# Patient Record
Sex: MALE | Race: WHITE | Employment: FULL TIME | URBAN - METROPOLITAN AREA
[De-identification: names, ages, dates, MRNs, and addresses within clinical notes are randomized per-mention and may not be internally consistent; named-entity substitution may affect disease eponyms.]

---

## 2017-11-07 ENCOUNTER — OFFICE VISIT (OUTPATIENT)
Dept: URGENT CARE | Facility: URGENT CARE | Age: 27
End: 2017-11-07
Payer: COMMERCIAL

## 2017-11-07 VITALS
HEART RATE: 80 BPM | DIASTOLIC BLOOD PRESSURE: 84 MMHG | TEMPERATURE: 98 F | OXYGEN SATURATION: 98 % | SYSTOLIC BLOOD PRESSURE: 147 MMHG | BODY MASS INDEX: 21.48 KG/M2 | WEIGHT: 158.4 LBS

## 2017-11-07 DIAGNOSIS — R21 RASH AND NONSPECIFIC SKIN ERUPTION: Primary | ICD-10-CM

## 2017-11-07 DIAGNOSIS — R03.0 ELEVATED BLOOD PRESSURE READING WITHOUT DIAGNOSIS OF HYPERTENSION: ICD-10-CM

## 2017-11-07 PROCEDURE — 99203 OFFICE O/P NEW LOW 30 MIN: CPT | Performed by: FAMILY MEDICINE

## 2017-11-07 NOTE — MR AVS SNAPSHOT
"              After Visit Summary   11/7/2017    Jose Parker    MRN: [id number]           Patient Information     Date Of Birth          1990        Visit Information        Provider Department      11/7/2017 3:25 PM Olga Lidia Parrish MD [facility]        Today's Diagnoses     Rash and nonspecific skin eruption    -  1      Care Instructions    -Try over-the-counter 1% Hydrocortisone cream twice daily x 3 days.  -If no improvement, try Lotrimin AF 1% cream twice daily x 7 days.  -If worsening condition or failure to improve with Hydrocortisone and Lotrimin AF, please follow up for further evaluation.          Follow-ups after your visit        Who to contact     If you have questions or need follow up information about today's clinic visit or your schedule please contact [facility] directly at [phone].  Normal or non-critical lab and imaging results will be communicated to you by [employer], letter or phone within 4 business days after the clinic has received the results. If you do not hear from us within 7 days, please contact the clinic through ideaForge[employer] or phone. If you have a critical or abnormal lab result, we will notify you by phone as soon as possible.  Submit refill requests through [employer] or call your pharmacy and they will forward the refill request to us. Please allow 3 business days for your refill to be completed.          Additional Information About Your Visit        MyChart Information     [employer] lets you send messages to your doctor, view your test results, renew your prescriptions, schedule appointments and more. To sign up, go to [email or web address] . Click on \"Log in\" on the left side of the screen, which will take you to the Welcome page. Then click on \"Sign up Now\" on the right side of the page.     You will be asked to enter the access code listed below, as well as some personal information. Please follow the directions to " create your username and password.     Your access code is: [email or web address]  Expires: 2018  4:19 PM     Your access code will  in 90 days. If you need help or a new code, please call your Only clinic or [phone].        Care EveryWhere ID     This is your Care EveryWhere ID. This could be used by other organizations to access your Only medical records  [id number]        Your Vitals Were     Pulse Temperature Pulse Oximetry BMI (Body Mass Index)          80 98  F (36.7  C) (Tympanic) 98% 21.48 kg/m2         Blood Pressure from Last 3 Encounters:   17 147/84   16 124/78   14 122/74    Weight from Last 3 Encounters:   17 158 lb 6.4 oz (71.8 kg)   16 160 lb (72.6 kg)   10/17/11 155 lb (70.3 kg)              Today, you had the following     No orders found for display       Primary Care Provider    Physician No Ref-Primary       NO REF-PRIMARY PHYSICIAN        Equal Access to Services     [facility]: Hadii aad ku hadasho Soomaali, waaxda luqadaha, qaybta kaalmada adeegyaney, shaila potter . So [facility] [phone].    ATENCIÓN: Si habla español, tiene a hills disposición servicios gratuitos de asistencia lingüística. Llame al [phone].    We comply with applicable federal civil rights laws and [place] laws. We do not discriminate on the basis of race, color, national origin, age, disability, sex, sexual orientation, or gender identity.            Thank you!     Thank you for choosing [facility]  for your care. Our goal is always to provide you with excellent care. Hearing back from our patients is one way we can continue to improve our services. Please take a few minutes to complete the written survey that you may receive in the mail after your visit with us. Thank you!             Your Updated Medication List - Protect others around you: Learn how to safely use, store and throw away your medicines at www.disposemymeds.org.           This list is accurate as of: 11/7/17  4:19 PM.  Always use your most recent med list.                   Brand Name Dispense Instructions for use Diagnosis    NO ACTIVE MEDICATIONS

## 2017-11-07 NOTE — NURSING NOTE
Chief Complaint   Patient presents with     Urgent Care     Derm Problem     Pt states x 3 days has had dry itchy patch on penis.        Initial /84 (BP Location: Right arm, Patient Position: Chair, Cuff Size: Adult Regular)  Pulse 80  Temp 98  F (36.7  C) (Tympanic)  Wt 158 lb 6.4 oz (71.8 kg)  SpO2 98%  BMI 21.48 kg/m2 Estimated body mass index is 21.48 kg/(m^2) as calculated from the following:    Height as of 10/17/11: 6' (1.829 m).    Weight as of this encounter: 158 lb 6.4 oz (71.8 kg).  Medication Reconciliation: unable or not appropriate to perform   Prabhu Watts CMA (AAMA) 11/7/2017 3:44 PM

## 2017-11-07 NOTE — PATIENT INSTRUCTIONS
-Try over-the-counter 1% Hydrocortisone cream twice daily x 3 days.  -If no improvement, try Lotrimin AF 1% cream twice daily x 7 days.  -If worsening condition or failure to improve with Hydrocortisone and Lotrimin AF, please follow up for further evaluation.

## 2017-11-08 NOTE — PROGRESS NOTES
CC:    Chief Complaint   Patient presents with     Urgent Care     Derm Problem     Pt states x 3 days has had dry itchy patch on penis.        SUBJECTIVE:  The patient is a 26 year old male, who presents with a rash involving his penis and scrotum, first noticed after spending the night in a questionable hotel 4 nights ago.  Patient used new soaps and detergents at the hotel.  He is concerned that he may have contracted an STD from the hotel sheets.  Initial rash was itchy and mildly painful, with a small amount of blood noted on 1 occasion.  No purulent drainage or blistering reported.  Patient recalls wearing new long underwear and jeans for an extended time prior to the rash onset.  No history of sexually activity, dysuria, urinary frequency, hematuria, abdominal pain, or back pain.  Patient denies trying over-the-counter treatments, as the rash and symptoms initially resolved over a 48 hour period.  Patient decided to seek evaluation today, as he noticed chaffing, mild itching, and a dry, scaly patch at the base of his penis while he was at work.  Patient would like to rule out STD or treatable infection today.    Past Medical History:   Diagnosis Date     Hematuria      Rumination disorder      Current Outpatient Prescriptions   Medication Sig Dispense Refill     NO ACTIVE MEDICATIONS        No Known Allergies  Social History   Substance Use Topics     Smoking status: Never Smoker     Smokeless tobacco: Not on file     Alcohol use No     FH:  Father with unspecified heart valve problem and eczema.    ROS:  The patient noted URI symptoms the past 7-10 days, including nasal congestion and postnasal drainage.  Occasional cough, but no fever, sore throat, facial pressure, chest pain, or shortness of breath.  Patient declines being seen for his URI symptoms today.    OBJECTIVE:  /84 (BP Location: Right arm, Patient Position: Chair, Cuff Size: Adult Regular)  Pulse 80  Temp 98  F (36.7  C) (Tympanic)  Wt  158 lb 6.4 oz (71.8 kg)  SpO2 98%  BMI 21.48 kg/m2  GENERAL APPEARANCE:  Awake, alert, and in no acute distress.  PSYCHIATRIC:  Pleasant affect.  HEENT:  Sclera anicteric.  No conjunctivitis.  PERRLA.  Extraocular movements are intact.  Bilateral TM's and canals are within normal limits.  Mild nasal congestion.  Some clear postnasal drainage.  No erythema, edema, or exudates of the oral mucosa or posterior pharynx.  Mucous membranes moist.  NECK:  Spontaneous full range of motion.  No thyromegaly or mass.  No lymphadenopathy.  HEART:  Normal S1, S2.  Regular rate and rhythm.  No murmurs, rubs, or gallops.  LUNGS:  No respiratory distress.  No wheezes, rales, or rhonchi.  ABDOMEN:  Not distended.  Soft.  Not tender.  No mass.  BACK:  No CVA tenderness.  EXTREMITIES:  Moves 4 extremities.   No edema.  NEUROLOGIC:  Gait within normal limits.  No facial droop or acute neurologic deficits.  /SKIN:  Chaperone was in room during /SKIN exam today.  Circumcised penis.  There is a quarter-sized patch of whitish scale at the based of the penis on the left, but no significant erythema noted.  There is a 3 mm area of brownish hyperpigmentation at the tip of the penis, but no erythema, blistering, or lesions noted.    LABORATORY:  Discussed risks and benefits of STD and Urinalysis testing, which patient agrees with holding off on post discussion today.  Patient does not have history or exam findings to suggest STD or UTI currently.      ASSESSMENT/PLAN:  1. Rash and nonspecific skin eruption, suspect contact/irritant dermatitis versus tinea cruris.  Doubt STD, based on history and exam.    -Try over-the-counter 1% Hydrocortisone cream twice daily x 3 days.  -If no improvement, try Lotrimin AF 1% cream twice daily x 7 days.  Patient declines prescription.  -If worsening condition or failure to improve with Hydrocortisone and Lotrimin AF, follow up for further evaluation, as discussed.    2. Elevated blood pressure reading  without diagnosis of hypertension.    -Follow blood pressures out in the community.  -Follow up if blood pressure is consistently > 140/90.  -Patient stated understanding.    The patient was discharged ambulatory and in stable condition post discussion of follow up.

## 2017-12-28 ENCOUNTER — HOSPITAL ENCOUNTER (EMERGENCY)
Facility: CLINIC | Age: 27
Discharge: HOME | End: 2017-12-28
Attending: EMERGENCY MEDICINE | Admitting: EMERGENCY MEDICINE
Payer: COMMERCIAL

## 2017-12-28 ENCOUNTER — APPOINTMENT (OUTPATIENT)
Dept: GENERAL RADIOLOGY | Facility: CLINIC | Age: 27
End: 2017-12-28
Attending: EMERGENCY MEDICINE
Payer: COMMERCIAL

## 2017-12-28 VITALS
BODY MASS INDEX: 21.7 KG/M2 | SYSTOLIC BLOOD PRESSURE: 114 MMHG | DIASTOLIC BLOOD PRESSURE: 73 MMHG | OXYGEN SATURATION: 98 % | HEART RATE: 78 BPM | TEMPERATURE: 97.7 F | WEIGHT: 160 LBS | RESPIRATION RATE: 16 BRPM

## 2017-12-28 DIAGNOSIS — G24.3 SPASMODIC TORTICOLLIS: ICD-10-CM

## 2017-12-28 PROCEDURE — 25000132 ZZH RX MED GY IP 250 OP 250 PS 637: Performed by: EMERGENCY MEDICINE

## 2017-12-28 PROCEDURE — 99283 EMERGENCY DEPT VISIT LOW MDM: CPT

## 2017-12-28 PROCEDURE — 72040 X-RAY EXAM NECK SPINE 2-3 VW: CPT

## 2017-12-28 RX ORDER — DIAZEPAM 5 MG
5 TABLET ORAL ONCE
Status: COMPLETED | OUTPATIENT
Start: 2017-12-28 | End: 2017-12-28

## 2017-12-28 RX ORDER — LIDOCAINE HYDROCHLORIDE 10 MG/ML
INJECTION, SOLUTION INFILTRATION; PERINEURAL
Status: DISCONTINUED
Start: 2017-12-28 | End: 2017-12-28 | Stop reason: WASHOUT

## 2017-12-28 RX ORDER — IBUPROFEN 800 MG/1
800 TABLET, FILM COATED ORAL ONCE
Status: COMPLETED | OUTPATIENT
Start: 2017-12-28 | End: 2017-12-28

## 2017-12-28 RX ORDER — LIDOCAINE 50 MG/G
1-2 PATCH TOPICAL EVERY 24 HOURS
Qty: 6 PATCH | Refills: 0 | Status: SHIPPED | OUTPATIENT
Start: 2017-12-28

## 2017-12-28 RX ORDER — DIAZEPAM 5 MG
5-10 TABLET ORAL EVERY 6 HOURS PRN
Qty: 10 TABLET | Refills: 0 | Status: SHIPPED | OUTPATIENT
Start: 2017-12-28 | End: 2018-01-04

## 2017-12-28 RX ORDER — LIDOCAINE 50 MG/G
1-2 PATCH TOPICAL EVERY 24 HOURS
Qty: 6 PATCH | Refills: 0 | Status: SHIPPED | OUTPATIENT
Start: 2017-12-28 | End: 2017-12-28

## 2017-12-28 RX ADMIN — IBUPROFEN 800 MG: 800 TABLET, FILM COATED ORAL at 06:53

## 2017-12-28 RX ADMIN — DIAZEPAM 5 MG: 5 TABLET ORAL at 06:53

## 2017-12-28 ASSESSMENT — ENCOUNTER SYMPTOMS
NECK PAIN: 1
TROUBLE SWALLOWING: 0
FEVER: 0
SHORTNESS OF BREATH: 0
NUMBNESS: 0
NECK STIFFNESS: 1
SORE THROAT: 0

## 2017-12-28 NOTE — DISCHARGE INSTRUCTIONS
Prescription strength dosing instructions:  - 600mg ibuprofen (Advil, Motrin) every 6 hours as needed for fever/pain/inflammation.  Naprosyn (Naproxen, Aleve) works similarly and can be used instead, if preferred.  - 650mg acetaminophen (tylenol) every 4 hours or 1000mg every 6 hours (maximum of 4000mg in 24 hours).  Acetaminophen is often in combination over the counter and prescription pain medications.  Be sure to include this in daily total.    These medications work differently and can be used in combination.      Torticollis (Wry Neck)  Torticollis happens when muscles on one side of the neck contract (tighten). This causes the neck to twist or tilt to the side. The muscles may also be quite sore. It affects mainly children and young adults, often appearing overnight. It can also affect infants who develop or are born with tight neck muscles on one side.  What causes torticollis?  Causes of torticollis include:    Congenital (present at birth). Injury to the neck muscles from an accident or other trauma, or even just sleeping in an unusual position    Side effect of certain medicines or drugs    Problems with the bones of the neck (which can happen after an infection or injury)    Spasm of the muscles due to an infection, such as an abscess in the neck  When to go to the emergency room (ER)  All neck problems should be checked by a healthcare provider within 24 hours. Seek emergency care if you can't reach your healthcare provider or these symptoms are present:    Trouble breathing or swallowing or in smaller children, continuous drooling    Numbness or weakness in the arms and legs    Trouble walking or speaking    Fever  What to expect in the ER  The neck will be examined, and questions about any current or former medical problems will be asked. X-rays of the neck may be taken to check for broken bones.  Treatment  The goal in treating torticollis is to relax the neck muscles. The best approach will depend  on the cause of the problem. In most cases, one or more of the following may be given:    Medicines to help relax the muscles and reduce swelling    Hot and cold compresses to help ease muscle tightness    Botulinum toxin injections to prevent further muscle spasms    Physical therapy to help stretch and relax the muscles    Treatment of any infection, which may need intravenous antibiotics or surgery  Follow-up  Depending on the cause, torticollis often goes away on its own. Follow up with your healthcare provider as instructed. If symptoms become worse, call your healthcare provider or return to the ER.  Date Last Reviewed: 9/30/2015 2000-2017 [employer]. [address] [place] [address], [place] [address]. All rights reserved. This information is not intended as a substitute for professional medical care. Always follow your healthcare professional's instructions.

## 2017-12-28 NOTE — ED AVS SNAPSHOT
[facility] Emergency Department    [address]    [facility] [address]    Phone:  [phone]    Fax:  [phone]                                       Jose Parker   MRN: [id number]    Department:  [facility] Emergency Department   Date of Visit:  12/28/2017           After Visit Summary Signature Page     I have received my discharge instructions, and my questions have been answered. I have discussed any challenges I see with this plan with the nurse or doctor.    ..........................................................................................................................................  Patient/Patient Representative Signature      ..........................................................................................................................................  Patient Representative Print Name and Relationship to Patient    ..................................................               ................................................  Date                                            Time    ..........................................................................................................................................  Reviewed by Signature/Title    ...................................................              ..............................................  Date                                                            Time

## 2017-12-28 NOTE — ED NOTES
Alert and oriented x 3 airway,breathing and circulation intact, neck pain which woke him at 1am, does not remember any injury except ran into a garage door yesterday

## 2017-12-28 NOTE — LETTER
[facility] EMERGENCY DEPARTMENT  [address] [place] [address]  [phone]    Joseana Parker  [address] [place] [address]  [phone] (home) NONE (work)    : 1990      To Whom it may concern:    Jose Parker was seen in our Emergency Department today, 2017.  Return to work is based on symptom improvement expected in 1-3 days.    Sincerely,      Twila Card MD

## 2017-12-28 NOTE — ED PROVIDER NOTES
History     Chief Complaint:  Neck Pain    HPI   Jose Parker is a 27 year old male who presents with neck pain. The patient states he was woken from sleep about 5.5 hours prior to arrival with a terrible neck pain. He states he was not able to get out of bed immediately, but when he held his head completely still he could resolve his pain and come here for evaluation. He has had no recent illness, but states he ran into a garage door yesterday, which jerked his neck. He felt fine subsequent to that event, and slept in a normal position last night. The patient denies numbness, tingling, sore throat, difficulty breathing or swallowing, and states no other concerns at this time.  Applied heat at home which helped; cool did not.    Allergies:  No known drug allergies.     Medications:    The patient is currently on no regular medications.     Past Medical History:    Hematuria  Rumination disorder     Past Surgical History:    Oral skin graft     Family History:    History reviewed. No pertinent family history.     Social History:  Presents to the emergency department with his father.    Marital Status:  Single   Smoking status: No   Alcohol use: No       Review of Systems   Constitutional: Negative for fever.   HENT: Negative for sore throat and trouble swallowing.    Respiratory: Negative for shortness of breath.    Musculoskeletal: Positive for neck pain and neck stiffness.   Neurological: Negative for numbness.   All other systems reviewed and are negative.    Physical Exam     Patient Vitals for the past 24 hrs:   BP Temp Heart Rate Resp SpO2 Weight   12/28/17 0621 (!) 120/104 97.7  F (36.5  C) 68 18 100 % 72.6 kg (160 lb)      Physical Exam  General: Cooperative, keeping head straight  Head:  The scalp, face, and head appear normal and symmetric  Eyes:  Sclera white  ENT:    External ears and nares normal  Neck:  No meningismus or bruit, no midline tenderness, L paraspinal tenderness at medial trapezius  ridge  CV:  Rate as above with regular rhythm   Resp:  Breath sounds clear and equal bilaterally  MS:  Moves all extremities  Neuro:  Speech is normal and fluent. No apparent deficit    Strength 5/5 x4.  Sensation intact x4.     Emergency Department Course   Imaging:  Radiology findings were communicated with the patient who voiced understanding of the findings.  Cervical spine XR, 2-3 views   Preliminary Result   IMPRESSION: No acute fracture or subluxation. No prevertebral soft   tissue swelling. Loss of the normal cervical lordosis may be   positional.         Interventions:  0653: Valium 5 mg PO  0653: Ibuprofen, 800 mg, PO      Emergency Department Course:  Nursing notes and vitals reviewed.  I performed an exam of the patient as documented above.   The patient was sent for a C-spine x-ray while in the emergency department, results above.   0708: Patient rechecked and updated.   Findings and plan explained to the patient. Patient discharged home with instructions regarding supportive care, medications, and reasons to return. The importance of close follow-up was reviewed.   I personally reviewed the imaging results with the Patient and answered all related questions prior to discharge.     Impression & Plan      Medical Decision Making:   Jose Parker is a 27 year old male who presents for evaluation of neck pain and difficulty turning.   Clinical examination is consistent with torticollis. There is no history of severe mechanism trauma and no evidence of cervical radiculopathy, ligamentous instability, myelopathy, dissection, spinal tumor or abscess at this time. No fevers or sore throat or clinical evidence of deep space infection such as retropharyngeal abscess, epiglottitis or meningitis. He has no risk factors for spinal epidural abscess or hematoma is without other neurologic symptoms.  Imaging of the cervical spine was unremarkable.  I do not feel laboratory studies are indicated at this point.  I  discussed worrisome symptoms/signs, if they were to evolve, that should prompt the patient to follow up more quickly or return to the ED.  Supportive outpatient management is indicated and antispasmodics and pain medication prescriptions were provided.  Lidocaine/trigger point injection offered and declined by the patient.    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    [facility] EMERGENCY DEPARTMENT       Twila Card MD  12/28/17 0078

## 2017-12-28 NOTE — ED AVS SNAPSHOT
[facility]    [address]    [facility] [address]    Phone:  [phone]    Fax:  [phone]                                       Jose Parker   MRN: [id number]    Department:  [facility]   Date of Visit:  12/28/2017           Patient Information     Date Of Birth          1990        Your diagnoses for this visit were:     Spasmodic torticollis        You were seen by Twila Card MD.      Follow-up Information     Follow up with [facility].    Specialty:  EMERGENCY MEDICINE    Why:  As needed, If symptoms worsen    Contact information:    [address] [place] [address] [phone]        Discharge Instructions         Prescription strength dosing instructions:  - 600mg ibuprofen (Advil, Motrin) every 6 hours as needed for fever/pain/inflammation.  Naprosyn (Naproxen, Aleve) works similarly and can be used instead, if preferred.  - 650mg acetaminophen (tylenol) every 4 hours or 1000mg every 6 hours (maximum of 4000mg in 24 hours).  Acetaminophen is often in combination over the counter and prescription pain medications.  Be sure to include this in daily total.    These medications work differently and can be used in combination.      Torticollis (Wry Neck)  Torticollis happens when muscles on one side of the neck contract (tighten). This causes the neck to twist or tilt to the side. The muscles may also be quite sore. It affects mainly children and young adults, often appearing overnight. It can also affect infants who develop or are born with tight neck muscles on one side.  What causes torticollis?  Causes of torticollis include:    Congenital (present at birth). Injury to the neck muscles from an accident or other trauma, or even just sleeping in an unusual position    Side effect of certain medicines or drugs    Problems with the bones of the neck  (which can happen after an infection or injury)    Spasm of the muscles due to an infection, such as an abscess in the neck  When to go to the emergency room (ER)  All neck problems should be checked by a healthcare provider within 24 hours. Seek emergency care if you can't reach your healthcare provider or these symptoms are present:    Trouble breathing or swallowing or in smaller children, continuous drooling    Numbness or weakness in the arms and legs    Trouble walking or speaking    Fever  What to expect in the ER  The neck will be examined, and questions about any current or former medical problems will be asked. X-rays of the neck may be taken to check for broken bones.  Treatment  The goal in treating torticollis is to relax the neck muscles. The best approach will depend on the cause of the problem. In most cases, one or more of the following may be given:    Medicines to help relax the muscles and reduce swelling    Hot and cold compresses to help ease muscle tightness    Botulinum toxin injections to prevent further muscle spasms    Physical therapy to help stretch and relax the muscles    Treatment of any infection, which may need intravenous antibiotics or surgery  Follow-up  Depending on the cause, torticollis often goes away on its own. Follow up with your healthcare provider as instructed. If symptoms become worse, call your healthcare provider or return to the ER.  Date Last Reviewed: 9/30/2015 2000-2017 [employer]. [address] [place] [address]. All rights reserved. This information is not intended as a substitute for professional medical care. Always follow your healthcare professional's instructions.          24 Hour Appointment Hotline       To make an appointment at any [facility], call [phone] ([phone]). If you don't have a family doctor or clinic, we will help you find one. [place] clinics are conveniently located to serve the needs of you  and your family.             Review of your medicines      START taking        Dose / Directions Last dose taken    diazepam 5 MG tablet   Commonly known as:  VALIUM   Dose:  5-10 mg   Quantity:  10 tablet        Take 1-2 tablets (5-10 mg) by mouth every 6 hours as needed for muscle spasms   Refills:  0        lidocaine 5 % Patch   Commonly known as:  LIDODERM   Dose:  1-2 patch   Quantity:  6 patch        Place 1-2 patches onto the skin every 24 hours   Refills:  0          Our records show that you are taking the medicines listed below. If these are incorrect, please call your family doctor or clinic.        Dose / Directions Last dose taken    NO ACTIVE MEDICATIONS        Refills:  0                Prescriptions were sent or printed at these locations (2 Prescriptions)                   Other Prescriptions                Printed at Department/Unit printer (2 of 2)         diazepam (VALIUM) 5 MG tablet               lidocaine (LIDODERM) 5 % Patch                Procedures and tests performed during your visit     Cervical spine XR, 2-3 views      Orders Needing Specimen Collection     None      Pending Results     Date and Time Order Name Status Description    12/28/2017 0631 Cervical spine XR, 2-3 views Preliminary             Pending Culture Results     No orders found from 12/26/2017 to 12/29/2017.            Pending Results Instructions     If you had any lab results that were not finalized at the time of your Discharge, you can call the ED Lab Result RN at [phone]. You will be contacted by this team for any positive Lab results or changes in treatment. The nurses are available 7 days a week from 10A to 6:30P.  You can leave a message 24 hours per day and they will return your call.        Test Results From Your Hospital Stay        12/28/2017  7:02 AM      Narrative     XR CERVICAL SPINE 2/3 VWS  12/28/2017 6:54 AM      HISTORY: Torticollis, head injury.      COMPARISON: None.        Impression      IMPRESSION: No acute fracture or subluxation. No prevertebral soft  tissue swelling. Loss of the normal cervical lordosis may be  positional.                Clinical Quality Measure: Blood Pressure Screening     Your blood pressure was checked while you were in the emergency department today. The last reading we obtained was  BP: (!) 120/104 . Please read the guidelines below about what these numbers mean and what you should do about them.  If your systolic blood pressure (the top number) is less than 120 and your diastolic blood pressure (the bottom number) is less than 80, then your blood pressure is normal. There is nothing more that you need to do about it.  If your systolic blood pressure (the top number) is 120-139 or your diastolic blood pressure (the bottom number) is 80-89, your blood pressure may be higher than it should be. You should have your blood pressure rechecked within a year by a primary care provider.  If your systolic blood pressure (the top number) is 140 or greater or your diastolic blood pressure (the bottom number) is 90 or greater, you may have high blood pressure. High blood pressure is treatable, but if left untreated over time it can put you at risk for heart attack, stroke, or kidney failure. You should have your blood pressure rechecked by a primary care provider within the next 4 weeks.  If your provider in the emergency department today gave you specific instructions to follow-up with your doctor or provider even sooner than that, you should follow that instruction and not wait for up to 4 weeks for your follow-up visit.        Thank you for choosing [facility]       Thank you for choosing [facility] for your care. Our goal is always to provide you with excellent care. Hearing back from our patients is one way we can continue to improve our services. Please take a few minutes to complete the written survey that you may receive in the mail after you visit with us. Thank you!       "  MyChart Information     [facility] lets you send messages to your doctor, view your test results, renew your prescriptions, schedule appointments and more. To sign up, go to [email or web address] . Click on \"Log in\" on the left side of the screen, which will take you to the Welcome page. Then click on \"Sign up Now\" on the right side of the page.     You will be asked to enter the access code listed below, as well as some personal information. Please follow the directions to create your username and password.     Your access code is: [email or web address]  Expires: 2018  4:19 PM     Your access code will  in 90 days. If you need help or a new code, please call your [facility] clinic or [phone].        Care EveryWhere ID     This is your Care EveryWhere ID. This could be used by other organizations to access your [facility] medical records  [id number]        Equal Access to Services     GLADYS SAINI AH: Hadii jerson zavalao Sogayle, waaxda luqadaha, qaybta kaalmada adealeksandra, shaila potter . So [facility] [phone].    ATENCIÓN: Si habla español, tiene a hills disposición servicios gratuitos de asistencia lingüística. Llame al [phone].    We comply with applicable federal civil rights laws and [place] laws. We do not discriminate on the basis of race, color, national origin, age, disability, sex, sexual orientation, or gender identity.            After Visit Summary       This is your record. Keep this with you and show to your community pharmacist(s) and doctor(s) at your next visit.                  "